# Patient Record
Sex: FEMALE | Race: ASIAN | NOT HISPANIC OR LATINO | URBAN - METROPOLITAN AREA
[De-identification: names, ages, dates, MRNs, and addresses within clinical notes are randomized per-mention and may not be internally consistent; named-entity substitution may affect disease eponyms.]

---

## 2018-02-09 ENCOUNTER — INPATIENT (INPATIENT)
Facility: HOSPITAL | Age: 38
LOS: 1 days | Discharge: HOME | DRG: 312 | End: 2018-02-11
Attending: STUDENT IN AN ORGANIZED HEALTH CARE EDUCATION/TRAINING PROGRAM | Admitting: STUDENT IN AN ORGANIZED HEALTH CARE EDUCATION/TRAINING PROGRAM
Payer: COMMERCIAL

## 2018-02-09 VITALS
HEART RATE: 65 BPM | SYSTOLIC BLOOD PRESSURE: 112 MMHG | OXYGEN SATURATION: 100 % | DIASTOLIC BLOOD PRESSURE: 71 MMHG | RESPIRATION RATE: 18 BRPM

## 2018-02-09 DIAGNOSIS — R55 SYNCOPE AND COLLAPSE: ICD-10-CM

## 2018-02-09 DIAGNOSIS — F33.1 MAJOR DEPRESSIVE DISORDER, RECURRENT, MODERATE: ICD-10-CM

## 2018-02-09 DIAGNOSIS — R45.851 SUICIDAL IDEATIONS: ICD-10-CM

## 2018-02-09 DIAGNOSIS — R00.2 PALPITATIONS: ICD-10-CM

## 2018-02-09 DIAGNOSIS — F64.0 TRANSSEXUALISM: ICD-10-CM

## 2018-02-09 DIAGNOSIS — Z29.9 ENCOUNTER FOR PROPHYLACTIC MEASURES, UNSPECIFIED: ICD-10-CM

## 2018-02-09 LAB
ALBUMIN SERPL ELPH-MCNC: 4.6 G/DL — SIGNIFICANT CHANGE UP (ref 3.3–5)
ALP SERPL-CCNC: 46 U/L — SIGNIFICANT CHANGE UP (ref 40–120)
ALT FLD-CCNC: 15 U/L RC — SIGNIFICANT CHANGE UP (ref 10–45)
ANION GAP SERPL CALC-SCNC: 12 MMOL/L — SIGNIFICANT CHANGE UP (ref 5–17)
AST SERPL-CCNC: 16 U/L — SIGNIFICANT CHANGE UP (ref 10–40)
BASOPHILS # BLD AUTO: 0 K/UL — SIGNIFICANT CHANGE UP (ref 0–0.2)
BASOPHILS NFR BLD AUTO: 0.7 % — SIGNIFICANT CHANGE UP (ref 0–2)
BILIRUB SERPL-MCNC: 0.8 MG/DL — SIGNIFICANT CHANGE UP (ref 0.2–1.2)
BUN SERPL-MCNC: 14 MG/DL — SIGNIFICANT CHANGE UP (ref 7–23)
CALCIUM SERPL-MCNC: 9.5 MG/DL — SIGNIFICANT CHANGE UP (ref 8.4–10.5)
CHLORIDE SERPL-SCNC: 103 MMOL/L — SIGNIFICANT CHANGE UP (ref 96–108)
CO2 SERPL-SCNC: 23 MMOL/L — SIGNIFICANT CHANGE UP (ref 22–31)
CREAT SERPL-MCNC: 0.91 MG/DL — SIGNIFICANT CHANGE UP (ref 0.5–1.3)
EOSINOPHIL # BLD AUTO: 0 K/UL — SIGNIFICANT CHANGE UP (ref 0–0.5)
EOSINOPHIL NFR BLD AUTO: 0.5 % — SIGNIFICANT CHANGE UP (ref 0–6)
GLUCOSE SERPL-MCNC: 85 MG/DL — SIGNIFICANT CHANGE UP (ref 70–99)
HCT VFR BLD CALC: 38 % — SIGNIFICANT CHANGE UP (ref 34.5–45)
HGB BLD-MCNC: 12.8 G/DL — SIGNIFICANT CHANGE UP (ref 11.5–15.5)
LYMPHOCYTES # BLD AUTO: 2 K/UL — SIGNIFICANT CHANGE UP (ref 1–3.3)
LYMPHOCYTES # BLD AUTO: 30 % — SIGNIFICANT CHANGE UP (ref 13–44)
MCHC RBC-ENTMCNC: 30.2 PG — SIGNIFICANT CHANGE UP (ref 27–34)
MCHC RBC-ENTMCNC: 33.7 GM/DL — SIGNIFICANT CHANGE UP (ref 32–36)
MCV RBC AUTO: 89.8 FL — SIGNIFICANT CHANGE UP (ref 80–100)
MONOCYTES # BLD AUTO: 0.3 K/UL — SIGNIFICANT CHANGE UP (ref 0–0.9)
MONOCYTES NFR BLD AUTO: 4.9 % — SIGNIFICANT CHANGE UP (ref 2–14)
NEUTROPHILS # BLD AUTO: 4.2 K/UL — SIGNIFICANT CHANGE UP (ref 1.8–7.4)
NEUTROPHILS NFR BLD AUTO: 64 % — SIGNIFICANT CHANGE UP (ref 43–77)
PLATELET # BLD AUTO: 284 K/UL — SIGNIFICANT CHANGE UP (ref 150–400)
POTASSIUM SERPL-MCNC: 3.7 MMOL/L — SIGNIFICANT CHANGE UP (ref 3.5–5.3)
POTASSIUM SERPL-SCNC: 3.7 MMOL/L — SIGNIFICANT CHANGE UP (ref 3.5–5.3)
PROT SERPL-MCNC: 8 G/DL — SIGNIFICANT CHANGE UP (ref 6–8.3)
RBC # BLD: 4.23 M/UL — SIGNIFICANT CHANGE UP (ref 3.8–5.2)
RBC # FLD: 13.4 % — SIGNIFICANT CHANGE UP (ref 10.3–14.5)
SODIUM SERPL-SCNC: 138 MMOL/L — SIGNIFICANT CHANGE UP (ref 135–145)
TROPONIN T SERPL-MCNC: <0.01 NG/ML — SIGNIFICANT CHANGE UP (ref 0–0.06)
TSH SERPL-MCNC: 0.84 UIU/ML — SIGNIFICANT CHANGE UP (ref 0.27–4.2)
WBC # BLD: 6.6 K/UL — SIGNIFICANT CHANGE UP (ref 3.8–10.5)
WBC # FLD AUTO: 6.6 K/UL — SIGNIFICANT CHANGE UP (ref 3.8–10.5)

## 2018-02-09 PROCEDURE — 99285 EMERGENCY DEPT VISIT HI MDM: CPT

## 2018-02-09 PROCEDURE — 99223 1ST HOSP IP/OBS HIGH 75: CPT | Mod: AI,GC

## 2018-02-09 PROCEDURE — 71045 X-RAY EXAM CHEST 1 VIEW: CPT | Mod: 26

## 2018-02-09 NOTE — ED PROVIDER NOTE - OBJECTIVE STATEMENT
37 year old biological female, currently transitioning to male with pmhx of mitral valve prolapse, umbilical hernia  presents with syncope episode at work today and persistent palpitations. Last month has similar episode of syncope and vomiting. Increased stress in patient's life due patient transitioning into male. Per patient she is taking testosterone. Undergoing transition to male at [facility] with Dr. Nash and is taking testosterone. Patient is sexually active with 1 male. Non smoker. Denies chest pain or SOB. Denies N/V/D.   Corine Varma Ma 37 year old biological female, currently transitioning to male with pmhx of mitral valve prolapse dx 7-10 years ago, umbilical hernia  presents with syncope episode at work today and persistent palpitations. Last month has similar episode of syncope and vomiting. Increased stress in patient's life due patient transitioning into male. Per patient she is taking testosterone. Undergoing transition to male at [facility] with Dr. Kim and is taking testosterone. Patient is sexually active with 1 male. Non smoker. Denies chest pain or SOB. Denies N/V/D.   Corine Varma Ma

## 2018-02-09 NOTE — ED BEHAVIORAL HEALTH ASSESSMENT NOTE - SUMMARY
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 Psychiatry consulted after pt expressed suicidal ideations to the evaluating ER physician.    Pt presents with long history of depressed mood, gender confusion and distress, with recent worsening of mood as well as escalating suicidal ideations and plans to harm himself developing over the last month. He also endorses worsening neurovegetative symptoms including anhedonia, isolative behavior, impaired sleep and appetite, in addition to anxiety and panic at work. Pt's partner also expresses concerns regarding pt's safety and does not feel that he is safe. Pt has been evaluated by his PMD and recommendation was made for pt to be admitted psychiatrically on a voluntary status. Pt had refused at the time but is currently interested in this. 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 Psychiatry consulted after pt expressed suicidal ideations to the evaluating ER physician.    Pt presents with long history of depressed mood, gender confusion and distress, with recent worsening of mood as well as escalating suicidal ideations and plans to harm himself developing over the last month. He endorses worsening neurovegetative symptoms including anhedonia, isolative behavior, impaired sleep and appetite, in addition to anxiety and panic at work. Further more, pt's partner expresses concerns regarding pt's safety and does not feel that he is safe. Pt has been evaluated by his PMD and recommendation was made for pt to be admitted psychiatrically on a voluntary status. Pt had refused at the time. At this time, pt was also offered a voluntary admission as this would be the least restrictive method of managing current condition. Pt requesting time to think about this while he is medically stabilized. If pt does refuse admission on a voluntary status, he will likely require psychiatric stabilization on an involuntary status.

## 2018-02-09 NOTE — H&P ADULT - FAMILY HISTORY
Father  Still living? Unknown  Family history of hypertension, Age at diagnosis: Age Unknown  Family history of coronary artery disease, Age at diagnosis: Age Unknown

## 2018-02-09 NOTE — ED PROVIDER NOTE - PROGRESS NOTE DETAILS
[employer]:  Planned echocardiogram for MVP eval, psych consult.  Psych reached and to see/clear pt.  Pt now stating that he may not be willing to stay.  Planned psych eval and will readdress with pt to see if he is willing to stay for echocardiogram in AM vs. AMA.  Pt remains stable at this time.  Pt refusing CXR as he does not wish to disrobe/take shirt off.  Stable at time of signout. Attd:  Received sign out pending psychiatry and cardiology evaluations.  Contacted by psychiatry, based off evaluation they are concerned for possible suicidality, they are placing patient on 1:1 observation and currently plan on psychiatry hospitalization after cleared medically.  Due to this cannot be placed in CDU for cardiac evaluation.  Per psychiatry cannot sign out AMA at this time.  Will discuss with internal medicine regarding admission.  Ezio Urbina M.D.

## 2018-02-09 NOTE — ED PROVIDER NOTE - MEDICAL DECISION MAKING DETAILS
37 year old biologic female who began transitioning to male, on testosterone for several months and has been follow up with Dr. Dey at [facility]. Endorsing 1 episodes of syncope and states that he has palpitations and known MVP for 7-10 years. Events of palpitation increasing for several weeks. Patient states that he has experienced palpitations and ot feeling well. Has syncopal event at work. Denies chest pain at event. Has been under stress and endorses some self harm. Does not specify SI. Patient states he was referred to ED by PCP for monitoring anxiety and self-harm however opted not to come to ED until today when he had syncopal episode at work. MVP recurrent with syncopal events. Concerned for primary cardiac workup etiology. Current hx of mounting anxiety, emotional strain, consult psychiatry , consult for emotional support, will do lab work, CDU observation for echocardiogram for MVP.

## 2018-02-09 NOTE — H&P ADULT - NSHPPHYSICALEXAM_GEN_ALL_CORE
PHYSICAL EXAM:  Vital Signs Last 24 Hrs  T(F): --  HR: 65 (09 Feb 2018 16:31) (65 - 65)  BP: 112/71 (09 Feb 2018 16:31) (112/71 - 112/71)  RR: 18 (09 Feb 2018 16:31) (18 - 18)  SpO2: 100% (09 Feb 2018 16:31) (100% - 100%)  GENERAL: NAD, well-developed  HEAD: Atraumatic, Normocephalic  EYES: EOMI, PERRLA, conjunctiva and sclera clear  NECK: Supple, No JVD  CHEST/LUNG: Clear to auscultation bilaterally; No wheezes/rales/rhonchi  HEART: Regular rate and rhythm; No murmurs, rubs, or gallops  ABDOMEN: Soft, Nontender, Nondistended; Bowel sounds present  EXTREMITIES:  2+ dP pulses b/l, No clubbing, cyanosis, or edema  PSYCH: reactive affect  NEUROLOGY: AAOx3, non-focal  SKIN: No rashes or lesions PHYSICAL EXAM:  Vital Signs Last 24 Hrs  T(F): --  HR: 65 (09 Feb 2018 16:31) (65 - 65)  BP: 112/71 (09 Feb 2018 16:31) (112/71 - 112/71)  RR: 18 (09 Feb 2018 16:31) (18 - 18)  SpO2: 100% (09 Feb 2018 16:31) (100% - 100%)  GENERAL: NAD, well-developed  HEAD: Atraumatic, Normocephalic  EYES: EOMI, PERRLA, conjunctiva and sclera clear  NECK: Supple, No JVD  CHEST/LUNG: Clear to auscultation bilaterally; No wheezes/rales/rhonchi  HEART: Regular rate and rhythm; No murmurs, rubs, or gallops  ABDOMEN: Soft, Nontender, Nondistended; Bowel sounds present  EXTREMITIES: 2+ dP pulses b/l, No clubbing, cyanosis, or edema  PSYCH: reactive affect  NEUROLOGY: AAOx3, non-focal  SKIN: No rashes or lesions PHYSICAL EXAM:  Vital Signs Last 24 Hrs  T(F): 97.4 (09 Feb 2018 22:24), Max: 97.4 (09 Feb 2018 22:24)  HR: 74 (09 Feb 2018 22:24) (65 - 74)  BP: 104/66 (09 Feb 2018 22:24) (104/66 - 112/71)  RR: 17 (09 Feb 2018 22:24) (17 - 18)  SpO2: 100% (09 Feb 2018 22:24) (100% - 100%)  GENERAL: NAD, well-developed  HEAD: Atraumatic, Normocephalic  EYES: EOMI, PERRLA, conjunctiva and sclera clear  NECK: Supple, No JVD  CHEST/LUNG: Clear to auscultation bilaterally; No wheezes/rales/rhonchi  HEART: Regular rate and rhythm; No murmurs, rubs, or gallops  ABDOMEN: Soft, Nontender, Nondistended; Bowel sounds present  EXTREMITIES: 2+ dP pulses b/l, No clubbing, cyanosis, or edema  PSYCH: reactive affect  NEUROLOGY: AAOx3, non-focal  SKIN: No rashes or lesions

## 2018-02-09 NOTE — H&P ADULT - NSHPSOCIALHISTORY_GEN_ALL_CORE
Patient is a never smoker, reports social EtOH only, denies recreational drug use. 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 Lives with  and child.

## 2018-02-09 NOTE — H&P ADULT - PROBLEM SELECTOR PLAN 2
Patient with suicidal ideations and reports long history of thoughts of self-harm  - seen and evaluated by psychiatry - deemed not to have capacity to leave AMA  - recommended initiating Sertraline 50mg QD, however patient refusing, stating "I don't even take vitamins"  - continue with constant observation  - per psych plan for inpatient psychiatric admission after medical clearance

## 2018-02-09 NOTE — H&P ADULT - PROBLEM SELECTOR PLAN 3
Patient is a trans man, recently began the transition on testosterone gel  - will hold given syncope and cardiac work up

## 2018-02-09 NOTE — ED PROVIDER NOTE - NS_ ATTENDINGSCRIBEDETAILS _ED_A_ED_FT
Attending MD Ravi.  Agree with above.  PT seen and assessed with scribe assistance in documentation in real time.

## 2018-02-09 NOTE — ED BEHAVIORAL HEALTH ASSESSMENT NOTE - HPI (INCLUDE ILLNESS QUALITY, SEVERITY, DURATION, TIMING, CONTEXT, MODIFYING FACTORS, ASSOCIATED SIGNS AND SYMPTOMS)
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 Psychiatry consulted after pt expressed suicidal ideations to the evaluating ER physician.    Pt found laying in bed, made poor eye contact, engaged appropriately in interview. He was AAOX3, denied feeling confused. 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 Pt reports long history of being "unhappy inside" initiating at age 8, but escalating over the last year since he had decided to pursue gender transformation, started taking exogenous testosterone. Pt reports being bullied and harassed at work, becomes tearful as he discussed the various people who had called him to ask inappropriate questions about his gender identity, reports feeling violated and "harassed" on a daily basis. Pt also reports on-going conflicts with his parents regarding this issue, informing them approximately one year ago. His father had stated that if pt were to pursue any means of transforming into a man, he would disown pt. Pt's mother had sent him a long letter one month ago stating that she did not understand pt and the life choices he is making. Over the past month, pt has been having panic attacks when entering his workplace, has difficulty waking up in the morning and going to work, feels anxious every time he has to choose clothing for himself to wear to work, reports feeling guilty about putting his family and his  whom he now refers to as his partner. Pt has had passive suicidal ideations in the past but these thoughts have become frequent, occurring multiple times during the day and has progressed to develop into a plan. Pt did not want to discuss the specific plans to usually comes to his mind but becomes tearful and reports feeling afraid. He report thinking of his son and not acting on those thoughts but there are time he is not sure why he does not act on his suicidal thoughts and plan. He reports going to his PMD one month ago, at which time recommendation was made for pt to be evaluated in the ED and admitted psychiatrically. However, pt did not pursue this because he had "too many things to do." Pt reports no longer feeling pleasure from any activity, having stopped attending his [employer] classes which was his hobby and source of peace for many years because the male members of the practice made crude gender specific jokes, he felt "too sensitive." In addition, he has not been sleeping well at night and has recently had poor appetite, unsure if he had lost weight.     He reports long history of anxiety, being in therapy for this in the past but currently engaged in bi-weekly couple's therapy with his partner. He reports currently feeling anxious all the time at work, having panic attacks several times, denies agoraphobia, denies specific phobias. He denies perceptual disturbances, denies paranoid or grandiose delusions, denies OCD symptoms and denies history of discrete trauma.     Collateral history obtained from  Lavell Wolf [phone] who reports pt has been crying a lot in the past month, isolating herself, staying home, having difficulty going to work in the morning, engaged in counseling over the summer as a couple and did better but as both partners are school teachers, when the school year began they could not find time to attend weekly therapy and this was reduced to bi-weekly. He reports pt is covered with many tattoos and suspects this is a form of self harming behavior. Pt's partner has noticed that pt has been escalating over time, confirms visit to PMD after pt had reported wish to "disappear." At that time a voluntary psychiatric hospitalization was recommended but pt refused. Pt's partner is concerned about safety and believes pt requires "a lot of help" at this time.

## 2018-02-09 NOTE — H&P ADULT - HISTORY OF PRESENT ILLNESS
Patient is a 38 yo trans M on exogenous testosterone, MVP presenting with syncope.    In the ED, VS on presentation: HR 65, /71, RR 18, SpO2 100% on RA  CBC unremarkable, CMP unremarkable. TSH wnl at 0.84. Troponin negative x 1. CXR clear lungs. While in the ED, the patient expressed suicidal ideations for which psychiatry was consulted - recommendation was made to keep patient in the hospital, and that the patient does not currently have capacity to leave AMA. The patient was admitted to medicine for clearance prior to psychiatric admission. Patient is a 36 yo trans M on exogenous testosterone, MVP presenting with syncope. The patient reports that today at work, he was feeling generally unwell and having palpitations all day. In the afternoon, he passed out, recalls waking up on the floor after the event. Denies chest pain, shortness of breath, lightheadedness prior to the vent. Reports no recent nausea, vomiting, fever, chills, nasal congestion, sore throat. 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 No recent travel.    He reports that he has palpitations frequently and has been worked up for it in the past. Reports they are intermittent, lasting a few seconds, then resolving spontaneously. Reports sometimes associated with a cough. States around 5 years ago had a Holter monitor which was wnl. Reports around the same time had an echo which showed MVP but was otherwise wnl. Denies family history of sudden cardiac arrest.    Of note, the patient has recently begun the transition process from female to male and is using testosterone gel.    In the ED, VS on presentation: HR 65, /71, RR 18, SpO2 100% on RA  CBC unremarkable, CMP unremarkable. TSH wnl at 0.84. Troponin negative x 1. CXR clear lungs. While in the ED, the patient expressed suicidal ideations for which psychiatry was consulted - recommendation was made to keep patient in the hospital, and that the patient does not currently have capacity to leave AMA. The patient was admitted to medicine for clearance prior to psychiatric admission. Patient is a 38 yo trans M on exogenous testosterone, MVP presenting with syncope. The patient reports that today at work, he was feeling generally unwell and having palpitations all day. In the afternoon, he passed out, recalls waking up on the floor after the event. Denies chest pain, shortness of breath, lightheadedness prior to the vent. Reports no recent nausea, vomiting, fever, chills, nasal congestion, sore throat. 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 No recent travel.    He reports that he has palpitations frequently and has been worked up for it in the past. Reports they are intermittent, lasting a few seconds, then resolving spontaneously. Reports sometimes associated with a cough. States around 5 years ago had a Holter monitor which was wnl. Reports around the same time had an echo which showed MVP but was otherwise wnl. Denies family history of sudden cardiac arrest.    Of note, the patient has recently begun the transition process from female to male and is using testosterone gel. iSTOP reference #[id number] shows one instance of testosterone 50 mg/5 gram pkt 30 day supply filled on 1/24/18. She takes no other meds.    He reports feelings of wanting to hurt himself for many years and also endorses active SI at this time. Reports a plan but does not feel comfortable disclosing the plan to me at this time. Of note, supportive  is at the bedside.    In the ED, VS on presentation: HR 65, /71, RR 18, SpO2 100% on RA  CBC unremarkable, CMP unremarkable. TSH wnl at 0.84. Troponin negative x 1. CXR clear lungs. While in the ED, the patient expressed suicidal ideations for which psychiatry was consulted - recommendation was made to keep patient in the hospital, and that the patient does not currently have capacity to leave AMA. The patient was admitted to medicine for clearance prior to psychiatric admission. Patient is a 36 yo trans M on exogenous testosterone, MVP presenting with syncope. The patient reports that today at work, he was feeling generally unwell and having palpitations all day. In the afternoon, he passed out, recalls waking up on the floor after the event. Denies chest pain, shortness of breath, lightheadedness prior to the vent. Reports no recent nausea, vomiting, fever, chills, nasal congestion, sore throat. 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 No recent travel.    He reports that he has palpitations frequently and has been worked up for it in the past. Reports they are intermittent, lasting a few seconds, then resolving spontaneously. Reports sometimes associated with a cough. States around 5 years ago had a Holter monitor which was wnl. Reports around the same time had an echo which showed MVP but was otherwise wnl. Denies family history of sudden cardiac arrest.    Of note, the patient has recently began the transition process from female to male and is using testosterone gel. iSTOP reference #[id number] shows one instance of testosterone 50 mg/5 gram pkt 30 day supply filled on 1/24/18. He takes no other meds.    He reports feelings of wanting to hurt himself for many years and also endorses active SI at this time. Reports a plan but does not feel comfortable disclosing the plan to me at this time. Of note, supportive  is at the bedside.    In the ED, VS on presentation: HR 65, /71, RR 18, SpO2 100% on RA  CBC unremarkable, CMP unremarkable. TSH wnl at 0.84. Troponin negative x 1. CXR clear lungs. While in the ED, the patient expressed suicidal ideations for which psychiatry was consulted - recommendation was made to keep patient in the hospital, and that the patient does not currently have capacity to leave AMA. The patient was admitted to medicine for clearance prior to psychiatric admission. Patient is a 38 yo trans M on exogenous testosterone, MVP presenting with syncope. The patient reports that today at work, he was feeling generally unwell and having palpitations all day. In the afternoon, he passed out, recalls waking up on the floor after the event. Denies chest pain, shortness of breath, lightheadedness prior to the vent. Reports no recent nausea, vomiting, fever, chills, nasal congestion, sore throat. 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 No recent travel.    He reports that he has palpitations frequently and has been worked up for it in the past. Reports they are intermittent, lasting a few seconds, then resolving spontaneously. Reports sometimes associated with a cough. States around 5 years ago had a Holter monitor which was wnl. Reports around the same time had an echo which showed MVP but was otherwise wnl. Denies family history of sudden cardiac arrest.  Pt also reports episode of syncope while pregnant in the past.    Of note, the patient has recently began the transition process from female to male and is using testosterone gel. iSTOP reference #[id number] shows one instance of testosterone 50 mg/5 gram pkt 30 day supply filled on 1/24/18. He takes no other meds.    He reports feelings of wanting to hurt himself for many years and also endorses active SI at this time. Reports a plan but does not feel comfortable disclosing the plan to me at this time. Of note, supportive  is at the bedside.    In the ED, VS on presentation: HR 65, /71, RR 18, SpO2 100% on RA  CBC unremarkable, CMP unremarkable. TSH wnl at 0.84. Troponin negative x 1. CXR clear lungs. While in the ED, the patient expressed suicidal ideations for which psychiatry was consulted - recommendation was made to keep patient in the hospital, and that the patient does not currently have capacity to leave AMA. The patient was admitted to medicine for clearance prior to psychiatric admission.

## 2018-02-09 NOTE — ED BEHAVIORAL HEALTH ASSESSMENT NOTE - CASE SUMMARY
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 Psychiatry consulted after pt expressed suicidal ideations to the evaluating ER physician.  Pt with increasing SI with plan in the setting of workplace harassment, parental disapproval, and other psychosocial stressors, with ambivalent intent (cites engagement in her work as a protective factor, but also feels burdened and stressed by it), wrote suicide notes in November, strained marital relationship with , increasing depression and anxiety.  No psychosis/vivian.  Requires psychiatric admission for safety and stabilization.  Recs: 1. C/w CO 1:1 for suicidality.  2. Zoloft 50mg PO daily.  3. PRN: Ativan 1mg PO q6h PRN anxiety.  Haldol 2mg IV q6h PRN severe agitation (monitor EKG for QTc <500).  4. Pt cannot leave AMA.  5. [facility] psych will f/u.

## 2018-02-09 NOTE — ED BEHAVIORAL HEALTH ASSESSMENT NOTE - PRIMARY DX
Moderate episode of recurrent major depressive disorder Severe episode of recurrent major depressive disorder, without psychotic features

## 2018-02-09 NOTE — ED BEHAVIORAL HEALTH ASSESSMENT NOTE - RISK ASSESSMENT
High risk: risk factors include chronic unremitting depression, active suicidal ideations with a plan, no engaged in treatment, undergoing gender transformation with loss of multiple family support, bullying at work. Pt is currently not engaged in psychiatric care and not on psychotropics to treat his mood symptoms. He is also not in individual therapy. Protective factors include his 8y/o son and partner who is supportive. Pt is engaged in couple's therapy

## 2018-02-09 NOTE — H&P ADULT - NSHPLABSRESULTS_GEN_ALL_CORE
Personally reviewed labs.   Personally reviewed imaging. CXR clear lungs.  Personally reviewed EKG.                           12.8   6.6   )-----------( 284      ( 09 Feb 2018 18:20 )             38.0       02-09    138  |  103  |  14  ----------------------------<  85  3.7   |  23  |  0.91    Ca    9.5      09 Feb 2018 18:20    TPro  8.0  /  Alb  4.6  /  TBili  0.8  /  DBili  x   /  AST  16  /  ALT  15  /  AlkPhos  46  02-09      CARDIAC MARKERS ( 09 Feb 2018 18:20 )  x     / <0.01 ng/mL / x     / x     / x        LIVER FUNCTIONS - ( 09 Feb 2018 18:20 )  Alb: 4.6 g/dL / Pro: 8.0 g/dL / ALK PHOS: 46 U/L / ALT: 15 U/L RC / AST: 16 U/L / GGT: x Personally reviewed labs.   Personally reviewed imaging. CXR clear lungs.  Personally reviewed EKG. NSR at 66bpm, TWI lead II, V1-2, aVR, aVL.                          12.8   6.6   )-----------( 284      ( 09 Feb 2018 18:20 )             38.0       02-09    138  |  103  |  14  ----------------------------<  85  3.7   |  23  |  0.91    Ca    9.5      09 Feb 2018 18:20    TPro  8.0  /  Alb  4.6  /  TBili  0.8  /  DBili  x   /  AST  16  /  ALT  15  /  AlkPhos  46  02-09      CARDIAC MARKERS ( 09 Feb 2018 18:20 )  x     / <0.01 ng/mL / x     / x     / x        LIVER FUNCTIONS - ( 09 Feb 2018 18:20 )  Alb: 4.6 g/dL / Pro: 8.0 g/dL / ALK PHOS: 46 U/L / ALT: 15 U/L RC / AST: 16 U/L / GGT: x

## 2018-02-09 NOTE — H&P ADULT - NSHPREVIEWOFSYSTEMS_GEN_ALL_CORE
Constitutional: denies fevers, chills, night sweats, weight loss  HEENT: denies visual changes, hearing changes, rhinitis, odynophagia, or dysphagia  Cardiovascular: denies palpitations, chest pain, edema  Respiratory: denies SOB, wheezing  Gastrointestinal: denies N/V/D, abdominal pain, hematochezia, melena  : denies dysuria, hematuria  MSK: denies weakness, joint pain  Neuro: no numbness or tingling  Psych: no depression or anxiety  Skin: denies new rashes or masses Constitutional: denies fevers, chills, night sweats, weight loss +syncope  HEENT: denies visual changes, hearing changes, rhinitis, odynophagia, or dysphagia  Cardiovascular: denies chest pain, edema +palpitations  Respiratory: denies SOB, wheezing  Gastrointestinal: denies N/V/D, abdominal pain, hematochezia, melena  : denies dysuria, hematuria  MSK: denies weakness, joint pain  Neuro: no numbness or tingling  Psych: +depression  Skin: denies new rashes or masses

## 2018-02-09 NOTE — ED BEHAVIORAL HEALTH ASSESSMENT NOTE - SUICIDE RISK FACTORS
Hopelessness/Perceived burden on family and others/Highly impulsive behavior/Mood episode/Access to means (pills, firearms, etc.)

## 2018-02-09 NOTE — H&P ADULT - PROBLEM SELECTOR PLAN 1
Patient presented with an episode of syncope and collapse in the setting of heart palpitations  - CBC, CMP, CXR unrevealing, Omer negative x 1, EKG NSR  - reported history of MVP - will check TTE to r/o worsening valvular disease  - monitor on telemetry for paroxysmal arrythmia  - ACS unlikely given patient's age and no risk factors Patient presented with an episode of syncope and collapse in the setting of heart palpitations  - CBC, CMP, CXR unrevealing, Omer negative x 1  - EKG NSR w/TWI  - reported history of MVP - will check TTE to r/o worsening valvular disease versus other structural heart disease  - monitor on telemetry for paroxysmal arrythmia  - check orthostatics  - ACS unlikely given patient's age and no risk factors, will trend Omer x one more set

## 2018-02-09 NOTE — ED ADULT TRIAGE NOTE - BP NONINVASIVE SYSTOLIC (MM HG)
We need to set this up at a single day of 1 gm MP IV every month, start the next few weeks. JS    Infusion orders faxed. Pt notified. KA   [phone]

## 2018-02-09 NOTE — H&P ADULT - ASSESSMENT
38 yo trans M on exogenous testosterone, MVP presenting with syncope admitted for syncope work-up and suicidal ideations

## 2018-02-09 NOTE — ED BEHAVIORAL HEALTH ASSESSMENT NOTE - DESCRIPTION
MVP Completed college, works as a middle school counselor, has been with domestic partner for >7 yrs, has a 6 y/o son Tearful but behaviorally controlled during her stay in the ED

## 2018-02-09 NOTE — ED ADULT NURSE NOTE - OBJECTIVE STATEMENT
36 yo female A&OX3 presents to the ED with the c/o palpitations. Pt states that she had a syncopal episode today while at work, + loc. States that she has a pmh of mitral valve prolapsed. Pt states that she has pasted out multiple times in the past. C/o palpitations and sob when she gets the palpitations. Lungs clear, equal b/l no cough and no chills. No c/o chest discomfort or chest pain. MAEX4

## 2018-02-09 NOTE — ED BEHAVIORAL HEALTH ASSESSMENT NOTE - DETAILS
Sister- pt suspects borderline personality disorder, mother is anxious, no one in treatment with psychiatry as far as pt is aware Pt being admitted medically plans to admit psychiatrically upon medical evaluation 7 y/o self cutting

## 2018-02-09 NOTE — H&P ADULT - ATTENDING COMMENTS
Pt seen and examined by me.  Agree with h/p, a/p as above.  Briefly 38 yo transgender man newly on testosterone therapy a/w syncope and palpitations.  exam as above.  studies/labs noted.  Concern for cardiovascular event given new initiation of testosterone, observe on tele, ck Omer, pt instructed to notify RN if symptoms occur  Given psychosocial stressors, depression, SI - pt on constant observation and followed by psych

## 2018-02-10 DIAGNOSIS — F33.2 MAJOR DEPRESSIVE DISORDER, RECURRENT SEVERE WITHOUT PSYCHOTIC FEATURES: ICD-10-CM

## 2018-02-10 LAB
APPEARANCE UR: CLEAR — SIGNIFICANT CHANGE UP
BILIRUB UR-MCNC: NEGATIVE — SIGNIFICANT CHANGE UP
CK MB CFR SERPL CALC: 1 NG/ML — SIGNIFICANT CHANGE UP (ref 0–3.8)
CK SERPL-CCNC: 85 U/L — SIGNIFICANT CHANGE UP (ref 25–170)
COD CRY URNS QL: ABNORMAL
COLOR SPEC: YELLOW — SIGNIFICANT CHANGE UP
DIFF PNL FLD: ABNORMAL
EPI CELLS # UR: SIGNIFICANT CHANGE UP /HPF
GLUCOSE UR QL: NEGATIVE — SIGNIFICANT CHANGE UP
KETONES UR-MCNC: ABNORMAL
LEUKOCYTE ESTERASE UR-ACNC: ABNORMAL
NITRITE UR-MCNC: NEGATIVE — SIGNIFICANT CHANGE UP
PH UR: 6 — SIGNIFICANT CHANGE UP (ref 5–8)
PROT UR-MCNC: SIGNIFICANT CHANGE UP
RBC CASTS # UR COMP ASSIST: SIGNIFICANT CHANGE UP /HPF (ref 0–2)
SP GR SPEC: 1.02 — SIGNIFICANT CHANGE UP (ref 1.01–1.02)
TROPONIN T SERPL-MCNC: <0.01 NG/ML — SIGNIFICANT CHANGE UP (ref 0–0.06)
UROBILINOGEN FLD QL: NEGATIVE — SIGNIFICANT CHANGE UP
WBC UR QL: SIGNIFICANT CHANGE UP /HPF (ref 0–5)

## 2018-02-10 PROCEDURE — 99233 SBSQ HOSP IP/OBS HIGH 50: CPT

## 2018-02-10 PROCEDURE — 93306 TTE W/DOPPLER COMPLETE: CPT | Mod: 26

## 2018-02-10 PROCEDURE — 99223 1ST HOSP IP/OBS HIGH 75: CPT | Mod: GC

## 2018-02-10 NOTE — DISCHARGE NOTE ADULT - CARE PLAN
Principal Discharge DX:	Syncope and collapse  Secondary Diagnosis:	Suicidal ideations  Secondary Diagnosis:	Transgender Principal Discharge DX:	Syncope and collapse  Goal:	Stable  Assessment and plan of treatment:	HOME CARE INSTRUCTIONS  Have someone stay with you until you feel stable.  Do not drive, operate machinery, or play sports until your caregiver says it is okay.  Keep all follow-up appointments as directed by your caregiver.   Lie down right away if you start feeling like you might faint. Breathe deeply and steadily. Wait until all the symptoms have passed.Drink enough fluids to keep your urine clear or pale yellow.  If you are taking blood pressure or heart medicine, get up slowly, taking several minutes to sit and then stand. This can reduce dizziness.  SEEK IMMEDIATE MEDICAL CARE IF:  You have a severe headache.  You have unusual pain in the chest, abdomen, or back.  You are bleeding from the mouth or rectum, or you have black or tarry stool.  You have an irregular or very fast heartbeat.  You have pain with breathing.  You have repeated fainting or seizure-like jerking during an episode.  You faint when sitting or lying down.  You have confusion.  You have difficulty walking.  You have severe weakness.  You have vision problems.  If you fainted, call your local emergency services (_____________________). Do not drive yourself to the hospital  Secondary Diagnosis:	Suicidal ideations  Goal:	Stable.  Assessment and plan of treatment:	Pt will be admitted to inpatient Psychiatry.  Secondary Diagnosis:	Transgender  Goal:	Current  Assessment and plan of treatment:	Continue present hormonal medications.

## 2018-02-10 NOTE — DISCHARGE NOTE ADULT - HOSPITAL COURSE
36 yo trans M on exogenous testosterone, MVP presenting with syncope admitted for syncope work-up and suicidal ideations    ·  Problem: Syncope and collapse.  Plan: Patient presented with an episode of syncope and collapse in the setting of heart palpitations  - trops neg   - EKG NSR w/TWI  - reported history of MVP - tte - normal   - monitor on telemetry for paroxysmal arrythmia  - ACS unlikely given patient's age and no risk factors, trops neg as above.     ·  Problem: Suicidal ideations.  Plan: Patient with suicidal ideations and reports long history of thoughts of self-harm  - seen and evaluated by psychiatry - deemed not to have capacity to leave AMA  - recommended initiating Sertraline 50mg QD, however patient refusing, stating "I don't even take vitamins"  - continue with constant observation  - per psych plan for inpatient psychiatric admission after medical clearance.     Problem: Transgender.  Plan: Patient is a trans man, recently began the transition on testosterone gel  pt medical cleared for transfer to in-patient psych facility

## 2018-02-10 NOTE — DISCHARGE NOTE ADULT - PATIENT PORTAL LINK FT
You can access the [facility] Patient Portal, offered by [facility], by registering with the following website: [email or web address]

## 2018-02-10 NOTE — DISCHARGE NOTE ADULT - CARE PROVIDER_API CALL
Juvencio Salmon,   In-Patient Psych  Phone: (   )    -  Fax: (   )    - [facility],   Inpatient psychiatry  Phone: (   )    -  Fax: (   )    -

## 2018-02-10 NOTE — DISCHARGE NOTE ADULT - PLAN OF CARE
Stable HOME CARE INSTRUCTIONS  Have someone stay with you until you feel stable.  Do not drive, operate machinery, or play sports until your caregiver says it is okay.  Keep all follow-up appointments as directed by your caregiver.   Lie down right away if you start feeling like you might faint. Breathe deeply and steadily. Wait until all the symptoms have passed.Drink enough fluids to keep your urine clear or pale yellow.  If you are taking blood pressure or heart medicine, get up slowly, taking several minutes to sit and then stand. This can reduce dizziness.  SEEK IMMEDIATE MEDICAL CARE IF:  You have a severe headache.  You have unusual pain in the chest, abdomen, or back.  You are bleeding from the mouth or rectum, or you have black or tarry stool.  You have an irregular or very fast heartbeat.  You have pain with breathing.  You have repeated fainting or seizure-like jerking during an episode.  You faint when sitting or lying down.  You have confusion.  You have difficulty walking.  You have severe weakness.  You have vision problems.  If you fainted, call your local emergency services (_____________________). Do not drive yourself to the hospital Stable. Pt will be admitted to inpatient Psychiatry. Current Continue present hormonal medications.

## 2018-02-10 NOTE — DISCHARGE NOTE ADULT - MEDICATION SUMMARY - MEDICATIONS TO TAKE
I will START or STAY ON the medications listed below when I get home from the hospital:    testosterone 50 mg/5 g (1%) transdermal gel  -- 1 packet(s) by transdermal patch once a day (in the morning)  -- Indication: For Transgender

## 2018-02-10 NOTE — DISCHARGE NOTE ADULT - PROVIDER TOKENS
FREE:[LAST:[Juvencio Salmon],PHONE:[(   )    -],FAX:[(   )    -],ADDRESS:[In-Patient Psych]] FREE:[LAST:[[facility]],PHONE:[(   )    -],FAX:[(   )    -],ADDRESS:[Inpatient psychiatry]]

## 2018-02-11 VITALS
OXYGEN SATURATION: 100 % | RESPIRATION RATE: 18 BRPM | SYSTOLIC BLOOD PRESSURE: 108 MMHG | DIASTOLIC BLOOD PRESSURE: 70 MMHG | TEMPERATURE: 99 F | HEART RATE: 68 BPM

## 2018-02-11 LAB
ANION GAP SERPL CALC-SCNC: 13 MMOL/L — SIGNIFICANT CHANGE UP (ref 5–17)
BUN SERPL-MCNC: 18 MG/DL — SIGNIFICANT CHANGE UP (ref 7–23)
CALCIUM SERPL-MCNC: 9 MG/DL — SIGNIFICANT CHANGE UP (ref 8.4–10.5)
CHLORIDE SERPL-SCNC: 106 MMOL/L — SIGNIFICANT CHANGE UP (ref 96–108)
CO2 SERPL-SCNC: 21 MMOL/L — LOW (ref 22–31)
CREAT SERPL-MCNC: 0.97 MG/DL — SIGNIFICANT CHANGE UP (ref 0.5–1.3)
GLUCOSE SERPL-MCNC: 91 MG/DL — SIGNIFICANT CHANGE UP (ref 70–99)
POTASSIUM SERPL-MCNC: 3.9 MMOL/L — SIGNIFICANT CHANGE UP (ref 3.5–5.3)
POTASSIUM SERPL-SCNC: 3.9 MMOL/L — SIGNIFICANT CHANGE UP (ref 3.5–5.3)
SODIUM SERPL-SCNC: 140 MMOL/L — SIGNIFICANT CHANGE UP (ref 135–145)

## 2018-02-11 PROCEDURE — 93306 TTE W/DOPPLER COMPLETE: CPT

## 2018-02-11 PROCEDURE — 82550 ASSAY OF CK (CPK): CPT

## 2018-02-11 PROCEDURE — 99285 EMERGENCY DEPT VISIT HI MDM: CPT | Mod: 25

## 2018-02-11 PROCEDURE — 99233 SBSQ HOSP IP/OBS HIGH 50: CPT

## 2018-02-11 PROCEDURE — 82553 CREATINE MB FRACTION: CPT

## 2018-02-11 PROCEDURE — 71045 X-RAY EXAM CHEST 1 VIEW: CPT

## 2018-02-11 PROCEDURE — 80048 BASIC METABOLIC PNL TOTAL CA: CPT

## 2018-02-11 PROCEDURE — 84484 ASSAY OF TROPONIN QUANT: CPT

## 2018-02-11 PROCEDURE — 99233 SBSQ HOSP IP/OBS HIGH 50: CPT | Mod: GC

## 2018-02-11 PROCEDURE — 80053 COMPREHEN METABOLIC PANEL: CPT

## 2018-02-11 PROCEDURE — 81001 URINALYSIS AUTO W/SCOPE: CPT

## 2018-02-11 PROCEDURE — 85027 COMPLETE CBC AUTOMATED: CPT

## 2018-02-11 PROCEDURE — 84443 ASSAY THYROID STIM HORMONE: CPT

## 2018-02-11 NOTE — PROGRESS NOTE BEHAVIORAL HEALTH - CASE SUMMARY
Patient is a 38 y/o female to male transgendered person, employed as a middle school guidance counselor,  with a 6 y/o biological son, domiciled with family, with PMHx of MVP, and PPHx of no diagnosed illness, history of self cutting behavior and self harming behavior since age 8, no prior psychiatric hospitalizations, 1 suicide attempt by hanging, BIB EMS after having a syncopal episode at work today. Psychiatry consulted after pt expressed suicidal ideations to the evaluating ER physician.  Pt with increasing SI with plan in the setting of workplace harassment, parental disapproval, and other psychosocial stressors, with ambivalent intent (cites engagement in her work as a protective factor, but also feels burdened and stressed by it), wrote suicide notes in November, strained marital relationship with , increasing depression and anxiety.  No psychosis/vivian.  Pt requesting to be d/c with superficial insight into need for psychiatric treatment, superficial with regard to suicidalty.  Requires psychiatric admission for safety and stabilization.  Recs: 1. C/w CO 1:1 for suicidality.  2. Zoloft 50mg PO daily.  3. PRN: Ativan 1mg PO q6h PRN anxiety.  Haldol 2mg IV q6h PRN severe agitation (monitor EKG for QTc <500).  4. Pt cannot leave AMA.  5.  Transfer to [facility] once medically cleared, 2PC legal status

## 2018-02-11 NOTE — PROGRESS NOTE BEHAVIORAL HEALTH - NSBHCHARTREVIEWVS_PSY_A_CORE FT
T(C): 36.7 (02-11-18 @ 04:55), Max: 36.9 (02-10-18 @ 21:06)  HR: 63 (02-11-18 @ 04:55) (63 - 69)  BP: 102/63 (02-11-18 @ 04:55) (102/63 - 115/72)  RR: 18 (02-11-18 @ 04:55) (17 - 18)  SpO2: 99% (02-11-18 @ 04:55) (98% - 99%)  Wt(kg): --

## 2018-02-11 NOTE — PROGRESS NOTE ADULT - PROBLEM SELECTOR PLAN 3
Patient is a trans man, recently began the transition on testosterone gel  - will hold given syncope and cardiac work up
Patient is a trans man, recently began the transition on testosterone gel  - will hold given syncope and cardiac work up

## 2018-02-11 NOTE — PROGRESS NOTE ADULT - ASSESSMENT
36 yo trans M on exogenous testosterone, MVP presenting with syncope admitted for syncope work-up and suicidal ideations
36 yo trans M on exogenous testosterone, MVP presenting with syncope admitted for syncope work-up and suicidal ideations

## 2018-02-11 NOTE — PROGRESS NOTE ADULT - PROBLEM SELECTOR PLAN 4
No need for pharmacologic DVT ppx as patient is low risk  Regular diet
No need for pharmacologic DVT ppx as patient is low risk  Regular diet

## 2018-02-11 NOTE — PROGRESS NOTE BEHAVIORAL HEALTH - NSBHCHARTREVIEWLAB_PSY_A_CORE FT
TSH 0.84                 12.8   6.6   )-----------( 284      ( 2018 18:20 )             38.0     02-11    140  |  106  |  18  ----------------------------<  91  3.9   |  21<L>  |  0.97    Ca    9.0      2018 06:14    TPro  8.0  /  Alb  4.6  /  TBili  0.8  /  DBili  x   /  AST  16  /  ALT  15  /  AlkPhos  46  02-09    Urinalysis Basic - ( 10 Feb 2018 07:11 )    Color: Yellow / Appearance: Clear / S.022 / pH: x  Gluc: x / Ketone: Small  / Bili: Negative / Urobili: Negative   Blood: x / Protein: Trace / Nitrite: Negative   Leuk Esterase: Moderate / RBC: 3-5 /HPF / WBC 11-25 /HPF   Sq Epi: x / Non Sq Epi: OCC /HPF / Bacteria: x

## 2018-02-11 NOTE — PROGRESS NOTE BEHAVIORAL HEALTH - NSBHCHARTREVIEWINVESTIGATE_PSY_A_CORE FT
< from: 12 Lead ECG (02.09.18 @ 20:09) >      Ventricular Rate 66 BPM    Atrial Rate 66 BPM    P-R Interval 170 ms    QRS Duration 90 ms     ms    QTc 438 ms    P Axis 84 degrees    R Axis 82 degrees    T Axis 66 degrees    Diagnosis Line NORMAL SINUS RHYTHM  POSSIBLE LEFT ATRIAL ENLARGEMENT    < end of copied text >

## 2018-02-11 NOTE — PROGRESS NOTE BEHAVIORAL HEALTH - RISK ASSESSMENT
High risk: risk factors include chronic unremitting depression, active suicidal ideations with a plan, past suicide attempt, not engaged in treatment, undergoing gender transformation with loss of multiple family support, bullying at work. Pt is currently not engaged in psychiatric care and not on psychotropics to treat his mood symptoms. He is also not in individual therapy. Protective factors include his 8y/o son and partner who is supportive. Future-oriented. Pt is engaged in couple's therapy.

## 2018-02-11 NOTE — PROGRESS NOTE ADULT - PROBLEM SELECTOR PLAN 2
Patient with suicidal ideations and reports long history of thoughts of self-harm  - seen and evaluated by psychiatry - deemed not to have capacity to leave AMA  - recommended initiating Sertraline 50mg QD, however patient refusing, stating "I don't even take vitamins"  - continue with constant observation  - per psych plan for inpatient psychiatric admission after medical clearance
Patient with suicidal ideations and reports long history of thoughts of self-harm  - seen and evaluated by psychiatry - deemed not to have capacity to leave AMA  - recommended initiating Sertraline 50mg QD, however patient refusing  - continue with constant observation  - per psych plan for inpatient psychiatric admission

## 2018-02-11 NOTE — PROGRESS NOTE ADULT - PROBLEM SELECTOR PLAN 1
Patient presented with an episode of syncope and collapse in the setting of heart palpitations  - trops neg   - EKG NSR w/TWI  - reported history of MVP - will check TTE to r/o worsening valvular disease versus other structural heart disease  - monitor on telemetry for paroxysmal arrythmia  - ACS unlikely given patient's age and no risk factors, trops neg as above
Patient presented with an episode of syncope and collapse in the setting of heart palpitations  - trops neg   - EKG NSR w/TWI  - reported history of MVP - TTE wnl  - ACS unlikely given patient's age and no risk factors, trops neg as above

## 2018-02-11 NOTE — PROGRESS NOTE BEHAVIORAL HEALTH - SUMMARY
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 Psychiatry consulted after pt expressed suicidal ideations to the evaluating ER physician.    Pt presents with long history of depressed mood, gender confusion and distress, with recent worsening of mood as well as escalating suicidal ideations and plans to harm himself developing over the last month in context of worsening depression/anxiety/panic attacks 2/2 workplace harassment, parental disapproval, strained marriage, and other psychosocial stressors, with ambivalent intent. Wrote suicide notes in November. He endorses worsening neurovegetative symptoms including anhedonia, isolative behavior, impaired sleep and appetite, in addition to anxiety and panic at work. Pt's partner expresses concerns regarding pt's safety and does not feel that he is safe. Pt has been evaluated by his PMD and recommendation was made for pt to be admitted psychiatrically on a voluntary status. Pt had refused at the time. Pt refused voluntary admission; due to ambivalence regarding suicidality in the context of worsening depression/anxiety, pt requires inpt admission at this time to ensure safety.

## 2018-02-11 NOTE — PROGRESS NOTE BEHAVIORAL HEALTH - NSBHFUPINTERVALHXFT_PSY_A_CORE
Pt says he is feeling "a lot better" today, headache has resolved. Still has palpitations a few times per day. Feels that with rest, a lot of anxious symptoms have improved. Pt states that environmental factors (including school environment, home, etc) are the cause of psychiatric distress and that he is working on improving them, wants to make a change in his school culture regarding LGBT community. Says "I don't want to die, I just don't want life to be like this." Pt cites son as a reason for living.  Pt spoke about suicide attempt in college after sister's  expressed romantic interest; pt was "drinking a lot" then and tried to hang himself; attempt was aborted by pt's sister, pt remembers having bruises around eyes the next morning. Pt's family, although aware, never spoke of it.    Pt's partner remains concerned regarding pt's safety at home.

## 2018-02-11 NOTE — PROGRESS NOTE ADULT - ATTENDING COMMENTS
pt is medically stable for transfer to inpatient psychiatric unit, possibly today, d/w psych, SW.  If transfer today, dc time 65mins

## 2018-08-22 NOTE — PROGRESS NOTE ADULT - SUBJECTIVE AND OBJECTIVE BOX
Addendum:    Correction:  All references in "ED Behavioral Health Assessment Note" on 2/10/18 and "Progress Note Behavioral Health" on 2/11/18 to the patient as "him/he/his" or "her/she/hers" should be deemed references to "them/they/their" (as these are the pronouns patient prefers to use).
Patient is a 37y old  Female who presents with a chief complaint of palpitation and syncope (10 Feb 2018 12:15)      SUBJECTIVE / OVERNIGHT EVENTS: no aeon, sinus on tele. pt states no cp/sob. minor palpitations. wants to go home to attend a party for their son    MEDICATIONS  (STANDING):    MEDICATIONS  (PRN):      Vital Signs Last 24 Hrs  T(C): 36.6 (10 Feb 2018 12:28), Max: 36.6 (10 Feb 2018 00:08)  T(F): 97.8 (10 Feb 2018 12:28), Max: 97.9 (10 Feb 2018 04:52)  HR: 64 (10 Feb 2018 12:) (64 - 74)  BP: 115/72 (10 Feb 2018 12:28) (100/63 - 115/72)  BP(mean): --  RR: 18 (10 Feb 2018 12:28) (17 - 18)  SpO2: 98% (10 Feb 2018 12:28) (98% - 100%)  CAPILLARY BLOOD GLUCOSE        I&O's Summary    2018 07:01  -  10 Feb 2018 07:00  --------------------------------------------------------  IN: 120 mL / OUT: 200 mL / NET: -80 mL        PHYSICAL EXAM:  CONSTITUTIONAL: NAD  HEAD:  Atraumatic, Normocephalic  EYES: EOMI, PERRLA, conjunctiva and sclera clear  ENMT: Supple, No JVD  RESPIRATORY: Clear to auscultation bilaterally; No wheeze  CARDIOVASCULAR: s1 s2 Regular rate and rhythm; No M/R/G  GI: Soft, Nontender, Nondistended; Bowel sounds present  EXTREMITIES:  2+ Peripheral Pulses, No clubbing, cyanosis, or edema  PSYCH: AAOx3  NEUROLOGY: non-focal  SKIN: No rashes or lesions    LABS:                        12.8   6.6   )-----------( 284      ( 2018 18:20 )             38.0     02-09    138  |  103  |  14  ----------------------------<  85  3.7   |  23  |  0.91    Ca    9.5      2018 18:20    TPro  8.0  /  Alb  4.6  /  TBili  0.8  /  DBili  x   /  AST  16  /  ALT  15  /  AlkPhos  46  02-09      CARDIAC MARKERS ( 10 Feb 2018 01:56 )  x     / <0.01 ng/mL / 85 U/L / x     / 1.0 ng/mL  CARDIAC MARKERS ( 2018 18:20 )  x     / <0.01 ng/mL / x     / x     / x          Urinalysis Basic - ( 10 Feb 2018 07:11 )    Color: Yellow / Appearance: Clear / S.022 / pH: x  Gluc: x / Ketone: Small  / Bili: Negative / Urobili: Negative   Blood: x / Protein: Trace / Nitrite: Negative   Leuk Esterase: Moderate / RBC: 3-5 /HPF / WBC 11-25 /HPF   Sq Epi: x / Non Sq Epi: OCC /HPF / Bacteria: x      RADIOLOGY & ADDITIONAL TESTS:  Imaging Personally Reviewed:   Consultant(s) Notes Reviewed:  psych   Care Discussed with Consultants/Other Providers:
Patient is a 37y old  Female who presents with a chief complaint of palpitation and syncope (10 Feb 2018 12:15)      SUBJECTIVE / OVERNIGHT EVENTS: no aeon, pt notes feeling improved mood, wants to go home. denies cp/sob. no events on tele     MEDICATIONS  (STANDING):    MEDICATIONS  (PRN):      Vital Signs Last 24 Hrs  T(C): 36.7 (2018 04:55), Max: 36.9 (10 Feb 2018 21:06)  T(F): 98 (2018 04:55), Max: 98.4 (10 Feb 2018 21:06)  HR: 63 (2018 04:55) (63 - 69)  BP: 102/63 (2018 04:55) (102/63 - 115/72)  BP(mean): --  RR: 18 (2018 04:55) (17 - 18)  SpO2: 99% (2018 04:55) (98% - 99%)  CAPILLARY BLOOD GLUCOSE        I&O's Summary    10 Feb 2018 07:01  -  2018 07:00  --------------------------------------------------------  IN: 480 mL / OUT: 0 mL / NET: 480 mL        PHYSICAL EXAM:  CONSTITUTIONAL: NAD  HEAD:  Atraumatic, Normocephalic  EYES: EOMI, PERRLA, conjunctiva and sclera clear  ENMT: Supple, No JVD  RESPIRATORY: Clear to auscultation bilaterally; No wheeze  CARDIOVASCULAR: s1 s2 Regular rate and rhythm; No M/R/G  GI: Soft, Nontender, Nondistended; Bowel sounds present  EXTREMITIES:  2+ Peripheral Pulses, No clubbing, cyanosis, or edema  PSYCH: AAOx3  NEUROLOGY: non-focal  SKIN: No rashes or lesions    LABS:                        12.8   6.6   )-----------( 284      ( 2018 18:20 )             38.0     02-11    140  |  106  |  18  ----------------------------<  91  3.9   |  21<L>  |  0.97    Ca    9.0      2018 06:14    TPro  8.0  /  Alb  4.6  /  TBili  0.8  /  DBili  x   /  AST  16  /  ALT  15  /  AlkPhos  46  02-09      CARDIAC MARKERS ( 10 Feb 2018 01:56 )  x     / <0.01 ng/mL / 85 U/L / x     / 1.0 ng/mL  CARDIAC MARKERS ( 2018 18:20 )  x     / <0.01 ng/mL / x     / x     / x          Urinalysis Basic - ( 10 Feb 2018 07:11 )    Color: Yellow / Appearance: Clear / S.022 / pH: x  Gluc: x / Ketone: Small  / Bili: Negative / Urobili: Negative   Blood: x / Protein: Trace / Nitrite: Negative   Leuk Esterase: Moderate / RBC: 3-5 /HPF / WBC 11-25 /HPF   Sq Epi: x / Non Sq Epi: OCC /HPF / Bacteria: x      RADIOLOGY & ADDITIONAL TESTS:  Imaging Personally Reviewed:   Consultant(s) Notes Reviewed:  psych   Care Discussed with Consultants/Other Providers: psych/NP

## 2022-03-09 NOTE — ED ADULT NURSE NOTE - PRO INTERPRETER NEED 2
English Protopic Pregnancy And Lactation Text: This medication is Pregnancy Category C. It is unknown if this medication is excreted in breast milk when applied topically.